# Patient Record
Sex: MALE | Race: OTHER
[De-identification: names, ages, dates, MRNs, and addresses within clinical notes are randomized per-mention and may not be internally consistent; named-entity substitution may affect disease eponyms.]

---

## 2017-01-12 ENCOUNTER — HOSPITAL ENCOUNTER (INPATIENT)
Dept: HOSPITAL 53 - M ED | Age: 27
LOS: 5 days | Discharge: HOME | DRG: 882 | End: 2017-01-17
Attending: PSYCHIATRY & NEUROLOGY | Admitting: PSYCHIATRY & NEUROLOGY
Payer: OTHER GOVERNMENT

## 2017-01-12 VITALS — SYSTOLIC BLOOD PRESSURE: 143 MMHG | DIASTOLIC BLOOD PRESSURE: 78 MMHG

## 2017-01-12 VITALS — WEIGHT: 235.01 LBS | BODY MASS INDEX: 30.16 KG/M2 | HEIGHT: 74 IN

## 2017-01-12 DIAGNOSIS — F43.23: Primary | ICD-10-CM

## 2017-01-12 DIAGNOSIS — F43.10: ICD-10-CM

## 2017-01-12 LAB
ALBUMIN SERPL BCG-MCNC: 4.5 GM/DL (ref 3.2–5.2)
ALBUMIN/GLOB SERPL: 1.25 {RATIO} (ref 1–1.93)
ALP SERPL-CCNC: 108 U/L (ref 45–117)
ALT SERPL W P-5'-P-CCNC: 32 U/L (ref 12–78)
AMPHETAMINES UR QL SCN: NEGATIVE
ANION GAP SERPL CALC-SCNC: 6 MEQ/L (ref 8–16)
AST SERPL-CCNC: 19 U/L (ref 15–37)
BENZODIAZ UR QL SCN: NEGATIVE
BILIRUB CONJ SERPL-MCNC: 0.1 MG/DL (ref 0–0.2)
BILIRUB SERPL-MCNC: 0.6 MG/DL (ref 0.2–1)
BUN SERPL-MCNC: 11 MG/DL (ref 7–18)
BZE UR QL SCN: NEGATIVE
CALCIUM SERPL-MCNC: 9.6 MG/DL (ref 8.5–10.1)
CHLORIDE SERPL-SCNC: 101 MEQ/L (ref 98–107)
CO2 SERPL-SCNC: 33 MEQ/L (ref 21–32)
CONTROL LINE: (no result)
CREAT SERPL-MCNC: 1.22 MG/DL (ref 0.7–1.3)
ERYTHROCYTE [DISTWIDTH] IN BLOOD BY AUTOMATED COUNT: 13.2 % (ref 11.5–14.5)
GFR SERPL CREATININE-BSD FRML MDRD: > 60 ML/MIN/{1.73_M2} (ref 60–?)
GLUCOSE SERPL-MCNC: 84 MG/DL (ref 70–105)
MCH RBC QN AUTO: 27.5 PG (ref 27–33)
MCHC RBC AUTO-ENTMCNC: 34.2 G/DL (ref 32–36.5)
MCV RBC AUTO: 80.2 FL (ref 80–96)
METHADONE UR QL SCN: NEGATIVE
OPIATES UR QL SCN: NEGATIVE
PLATELET # BLD AUTO: 189 K/MM3 (ref 150–450)
POTASSIUM SERPL-SCNC: 4.3 MEQ/L (ref 3.5–5.1)
PROT SERPL-MCNC: 8.1 GM/DL (ref 6.4–8.2)
SODIUM SERPL-SCNC: 140 MEQ/L (ref 136–145)
TRICYCLIC ANTIDEPRESS URINE: NEGATIVE
WBC # BLD AUTO: 5.5 K/MM3 (ref 4–10)

## 2017-01-12 NOTE — EDDOCDS
Physician Documentation                                                                           

[facility]                                                                         

Name: John Verde                                                                                

Age: 27 yrs                                                                                       

Sex: Male                                                                                         

: 1990                                                                                   

MRN: [id number]                                                                                     

Arrival Date: 2017                                                                          

Time: 10:19                                                                                       

Account#: [id number]                                                                              

Bed [facility]                                                                                          

Private MD:                                                                                       

Disposition:                                                                                      

17 12:49 Hospitalization ordered by Arron Laboy for Inpatient Admission.           

Preliminary diagnosis is Suicidal ideations.                                                    

- Bed requested for Admit.                                                                        

- Status is Inpatient Admission.                                                              ms2 

- Condition is Stable.                                                                            

- Problem is an ongoing problem.                                                                  

- Symptoms are unchanged.                                                                         

                                                                                                  

                                                                                                  

                                                                                                  

Historical:                                                                                       

- Allergies: Chocolate; Chocolate (migraines);                                                    

- Home Meds:                                                                                      

1. none                                                                                         

2. none                                                                                         

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                                                      

- PSHx: none; umbilical hernia repair;                                                            

- Social history: Smoking status: Patient states was never smoker of tobacco. No                  

barriers to communication noted, The patient speaks fluent English, Smoking status:             

Patient states was never smoker of tobacco. No barriers to communication noted, The             

patient speaks fluent English.                                                                  

- Family history: Not pertinent.                                                                  

- : The pt / caregiver states he / she is not on anticoagulants. The pt / caregiver               

states he / she is not on anticoagulants. Home medication list is obtained from the             

patient, Home medication list is obtained from the patient.                                     

- Exposure Risk Screening:: None identified. None identified.                                     

                                                                                                  

                                                                                                  

Vital Signs:                                                                                      

                                                                                             

11:31  / 67; Pulse 82; Resp 16; Temp 97.8(O); Pulse Ox 98% on R/A; Weight 102.06 kg /   dwg 

      225 lbs; Height 6 ft. 3 in. (190.50 cm); Pain 0/10;                                         

14:07  / 88; Pulse 60; Resp 18; Temp 97.5(T); Pulse Ox 98% on R/A;                      dpm 

11:31 Body Mass Index 28.12 (102.06 kg, 190.50 cm)                                            dwg 

                                                                                                  

MDM:                                                                                              

11:24 REGULAR DIET PLASTIC WARE+DIET ordered.                                                 EDMS

11:24 Consult PFS/PSA/ ordered.                                                  dwg 

11:24 Consult PFS/PSA/: Patient's case requires discussion with on-call          dwg 

      Psychiatrist ordered.                                                                       

11:24 PSA/PFS to call Nursing Supervisor, to enter patient data on [place] Safe Act if patient    dwg 

      involuntarily admitted or transferred for SI or HI ordered.                                 

11:24 Confirm accurate psychiatric medication list and times of last dosage ordered.          dwg 

11:24 Detain Pt Until Medically/PFS Cleared ordered.                                          dwg 

11:25 Acetaminophen Level Ordered.                                                            EDMS

11:25 Basic Metabolic Profile Ordered.                                                        EDMS

11:25 Complete Blood Count Ordered.                                                           EDMS

11:25 Drug Eval Toxicology ED Only Ordered.                                                   EDMS

11:25 Ethyl Alcohol (ethanol) Ordered.                                                        EDMS

11:25 Liver Profile Ordered.                                                                  EDMS

11:25 Salicylate Level Ordered.                                                               EDMS

11:25 Thyroid Stimulating Hormone Ordered.                                                    EDMS

12:48 BED REQUEST+ADM ordered.                                                                EDMS

12:49 [place] Safe Act reporting: The patient poses a significant risk to self or others, and      megan ASTORGA/PFS has notified the Nursing Supervisor and he/she will complete the required data      

      entry.                                                                                      

13:27 Financial registration complete.                                                        lg  

13:35 Admit to [facility]: ordered.                                                                 EDMS

13:38 [facility] Payment Agreement was scanned into [employer] and attached to record.               lg  

13:58 [facility] Legal paperwork was scanned into [employer] and attached to record.                    dora  

                                                                                                  

Signatures:                                                                                       

Dispatcher [employer]                           EDMS                                                 

Russell Perea,RN                   RN   [facility]                                                  

Kamran Sevilla RN                      RN   g                                                  

Benjamin Ledesma, PSA                    PSA  Tony Spaulding, Gabino Lin lg, LEONEL guzman                                                   

                                                                                                  

The chart was reviewed and I authenticate all verbal orders and agree with the evaluation and 
treatment provided.Attachments:

13:38 [facility] Payment Agreement                                                                lg  

                                                                                                  

**************************************************************************************************

MTDD

## 2017-01-12 NOTE — EDDOCDS
Nurse's Notes                                                                                     

[facility]                                                                         

Name: John Verde                                                                                

Age: 27 yrs                                                                                       

Sex: Male                                                                                         

: 1990                                                                                   

MRN: [id number]                                                                                     

Arrival Date: 2017                                                                          

Time: 10:19                                                                                       

Account#: [id number]                                                                              

Bed [facility]                                                                                          

Private MD:                                                                                       

Diagnosis: Suicidal ideations                                                                     

                                                                                                  

Presentation:                                                                                     

                                                                                             

10:33 Presenting complaint: Patient states: MHE, increased depression with thoughts of        dwg 

      hurting self, denies plan, states his son is ill and is awaiting surgery.                   

11:28 Mental Health Triage Level: Level 2: The patient displays active suicidal ideations.    [facility] 

      Adult Sepsis Screening: The patient does not have new or worsening altered mentation.       

      Patient's respiratory rate is less than 22. Systolic blood pressure is greater than         

      100. Patient has a qSOFA score of 0- Negative Sepsis Screen. Suicide/Homicide risk          

      assessment- The patient admits to and/or has been reported to be having suicidal            

      ideations.  Status: The patient is an active duty . Transition of     

      care: patient was received from [facility].                                 

11:28 Acuity: RANI Level 3                                                                     [facility] 

11:28 Method Of Arrival: Ambulance                                                            [facility] 

                                                                                                  

Triage Assessment:                                                                                

11:31 General: Appears in no apparent distress. Pain: Denies pain. HIV screening NA for this  [facility] 

      visit Offered previously.                                                                   

                                                                                                  

Historical:                                                                                       

- Allergies: Chocolate; Chocolate (migraines);                                                    

- Home Meds:                                                                                      

1. none                                                                                         

2. none                                                                                         

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                                                      

- PSHx: none; umbilical hernia repair;                                                            

- Social history: Smoking status: Patient states was never smoker of tobacco. No                  

barriers to communication noted, The patient speaks fluent English, Smoking status:             

Patient states was never smoker of tobacco. No barriers to communication noted, The             

patient speaks fluent English.                                                                  

- Family history: Not pertinent.                                                                  

- : The pt / caregiver states he / she is not on anticoagulants. The pt / caregiver               

states he / she is not on anticoagulants. Home medication list is obtained from the             

patient, Home medication list is obtained from the patient.                                     

- Exposure Risk Screening:: None identified. None identified.                                     

                                                                                                  

                                                                                                  

Screenin:33 Screening information is obtained from the patient. Fall risk: No risks identified.     ms2 

      Assistance ADL's: requires no assistance with activities of daily living. Abuse/DV          

      Screen: The patient / caregiver reports he/she is: not in a situation that causes fear,     

      pain or injury. Nutritional screening: No deficits noted. Advance Directives:               

      Currently, there is no health care proxy. There is no active DNR order. There is no         

      living will. There is no Power of . Advance directive information has not           

      previously been placed in an [facility] medical record. Further advance directive information      

      is declined. home support is adequate.                                                      

                                                                                                  

Assessment:                                                                                       

11:33 General: Appears in no apparent distress, comfortable, sitting on side of               ms2 

      stretcher----[employer] with pt. Behavior is soft spoken quiet. Neurological: Level     

      of Consciousness is awake, alert, obeys commands. Respiratory: No deficits noted.           

      Airway is patent Respiratory effort is even, unlabored, Respiratory pattern is regular,     

      symmetrical. Derm: Skin is pink, warm & dry. Musculoskeletal: Range of motion intact in   

      all extremities.                                                                            

12:30 General: Appears in no apparent distress, comfortable, Behavior is cooperative.         ms2 

      General: F. staff remain with pt. Neurological: Level of Consciousness is awake,        

      alert, obeys commands. Cardiovascular: No deficits noted. Derm: Skin is pink, warm &      

      dry. Musculoskeletal: No deficits noted.                                                    

13:17 General: Appears in no apparent distress, comfortable. Neurological: Respiratory: No    ms2 

      deficits noted. Derm: Skin is pink, warm & dry.                                           

14:30 Adult Sepsis Screening: The patient does not have new or worsening altered mentation.   ms2 

      Patient's respiratory rate is less than 22. Systolic blood pressure is greater than         

      100. Patient has a qSOFA score of 0- Negative Sepsis Screen. General: Appears in no         

      apparent distress, Behavior is cooperative. Pain: Denies pain. Neurological: Level of       

      Consciousness is awake, alert, obeys commands. Respiratory: No deficits noted. Airway       

      is patent Respiratory effort is even, unlabored, Respiratory pattern is regular,            

      symmetrical. GI: Abdomen is flat, non- distended. Derm: Skin is pink, warm & dry. Derm:   

      Skin is pink, warm & dry.                                                                 

                                                                                                  

Mental Health Eval:                                                                               

12:48 Mental health consult is initiated at 12:40.  Status: The patient is an active  rb  

      duty . [facility]: The patient is not an established patient       

      of [facility]. Referral Information: Evaluation referral is generated by the     

      patient's psychiatrist Dr. Willis \T\ Vincenzo Hui . The patient was referred for               

      evaluation because Pt presented to ED +SI with plan by motorcycle or drowning. Pt           

      reported depressed over a year, daily thoughts of SI but now has plan. Pt stated            

      "constantly" irritated /angry all the time and doesn't understand why. Pt reported in       

      [employer] for 6 years, deployed to [facility] [id number], moved to [facility] 2016. Pt is in          

      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 . Subjective: The patients chief complaint is       

      Depressed, +SI with plan, anger issues, increased anxiety.. Delusions are denied.           

      Patient's mood is angry, anxious, dysphoric, hopeless, irritable, Hallucinations are        

      denied. Mental Health history: no relevant mental health problems or treatments. Mental     

      Health Admissions: None. Current Outpatient Mental Health Services: None. Current           

      living environment is The patient currently lives with his / her spouse, of 5 years and     

      3 children; 5,4, and 1 yr/old. Pt reported preparing for 3y/o cranial surgery again. .      

      The patient is . Patient presents to Emergency Department with the following         

      symptoms within the past 2 weeks: agitation, anger, anxiety, increased appetite,            

      depressed mood, feelings of helplessness/hopelessness, poor concentration, sleep            

      disturbance - insomnia, suicidal ideation with plan for drowning. motorcycle .              

      Substance abuse: Pt denies. Mental status exam: Patients appearance is appropriate,         

      Patient's behavior is cooperative, minimally responsive Speech is slow. unspontaneous       

      Affect is flat. Mood is dysphoric. Hallucinations are denied. Appetite is characterized     

      by binges. Memory is good. Energy level is lethargic. Content of thought is depressive.     

      , Thought process is characterized by flight of ideas. Cognitive level is oriented to       

      person, place, time and situation Patient's insight is fair. Judgement is fair. Rapport     

      with interviewer is good. Suicidal Ideation present with a plan to kill self by             

      drowning. , motorcycle. Homicidal ideation is not present. Disposition: Medically           

      cleared for disposition by Gabino CASTANEDA Psychiatric Consult is performed by phone        

      with Dr Arron Laboy.                                                                

13:13 [facility] Admission Criteria: The patient is experiencing suicidal ideation. The patient     rb  

      displays symptoms of severe psychiatric disorder resulting in disordered behavior and       

      significant interference with his / her ability to maintain self care. Severe Anxiety.      

      The patient requires continuous observation and/or control to protect self, others or       

      property. The patient's care requires a multi-modal treatment plan under close              

      supervision and coordination due to the complexity and severity of the patient's            

      symptoms. Legal Status: Patient's legal status will be Emergency admission: . [place]        

      Safe [place]: [place] Safe [place] is applicable to this patient. The patient poses a risk to      

      self or other and the Nursing Supervisor has been notified. He/She will enter the           

      patient's data. Insurance Pre-Certification: Not Required.                                  

13:36 DSM-V Differential Diagnosis: Major Depressive Disorder unspecified (F33.9). Awaiting:  rb  

      transfer to [facility].                                                                           

13:58 Narrative: Pt legals placed with belongings.                                            rb  

                                                                                                  

Vital Signs:                                                                                      

11:31  / 67; Pulse 82; Resp 16; Temp 97.8(O); Pulse Ox 98% on R/A; Weight 102.06 kg;    dwg 

      Height 6 ft. 3 in. (190.50 cm); Pain 0/10;                                                  

14:07  / 88; Pulse 60; Resp 18; Temp 97.5(T); Pulse Ox 98% on R/A;                      dpm 

11:31 Body Mass Index 28.12 (102.06 kg, 190.50 cm)                                            [facility] 

                                                                                                  

Vitals:                                                                                           

11:31 Log In Time N/A - ambulance arrival.                                                    [facility] 

                                                                                                  

ED Course:                                                                                        

10:20 Patient visited by Tony Branham Reg.                                                lg  

10:20 Patient moved to Waiting                                                                lg  

10:22 Patient moved to [facility] [[email or web address]                                                                   dpm 

10:27 Pt greeted and oriented to ED. Patient advised of names of staff involved in care,      dpm 

      location of call bell, wait times and NPO status. Patient has correct armband on for        

      positive identification. Placed in gown. Placed in psych safe attire. Bed in low            

      position. Side rails up X 1. Security observing. Property removed, inventory done,          

      secured in belongings bag- placed in locked locker. Placed in locker 2. Psych Safety        

      Check: Location: Psych Room. Visual Assessment: Cooperative.                                

10:46 Patient visited by Pardeep Roach.                                                      dpm 

11:00 Patient visited by Pardeep Roach.                                                      dpm 

11:16 Patient visited by Pardeep Roach.                                                      dpm 

11:29 Patient visited by Russell Perea RN.                                               ms2 

11:29 Triage Initiated                                                                        dwg 

11:35 The patient / caregiver is instructed regarding the plan of care and ED course.         ms2 

      Security observing.                                                                         

11:35 No IV's were initiated during this patient's visit. No procedures done that require     ms2 

      assistance.                                                                                 

11:43 Acetaminophen Level Sent.                                                               ms2 

11:43 Basic Metabolic Profile Sent.                                                           ms2 

11:43 Complete Blood Count Sent.                                                              ms2 

11:43 Drug Eval Toxicology ED Only Sent.                                                      ms2 

11:43 Ethyl Alcohol (ethanol) Sent.                                                           ms2 

11:43 Liver Profile Sent.                                                                     ms2 

11:43 Salicylate Level Sent.                                                                  ms2 

11:43 Thyroid Stimulating Hormone Sent.                                                       ms2 

11:48 Patient visited by Pardeep Roach.                                                      dpm 

12:06 Patient visited by Pardeep Roach.                                                      dpm 

12:21 Patient visited by Pardeep Roach.                                                      dpm 

12:30 The patient / caregiver is instructed regarding the plan of care and ED course.         ms2 

      Security observing.                                                                         

12:31 Gabino Narvaez FNP is PHCP.                                                              ke  

12:31 Patient visited by Gabino Narvaez FNP.                                                   ke  

12:31 Patient visited by Gabino Narvaez FNP.                                                   ke  

12:48 Arron Laboy is Hospitalizing Provider.                                         ke  

12:55 Patient visited by Pardeep Roach.                                                      dpm 

13:15 Patient visited by Russell Perea,LIZZ.                                               ms2 

13:17 The patient / caregiver is instructed regarding the plan of care and ED course.         ms2 

      Security observing.                                                                         

13:25 Patient name changed from John\S\\S\Verde\S\ to John\S\Luis F\S\Verde.                    
   EDMS

13:38 Patient visited by Pardeep Roach.                                                      dpm 

13:38 [facility] Payment Agreement was scanned into [employer] and attached to record.               lg  

13:58 Patient visited by Pardeep Roach.                                                      dpm 

13:58 [facility] Legal paperwork was scanned into [employer] and attached to record.                    jl  

14:03 Patient visited by Pardeep Roach.                                                      dpm 

14:16 Patient visited by Pardeep Roach.                                                      dpm 

14:30 Patient visited by Russell Perea,LIZZ.                                               ms2 

14:31 The patient / caregiver is instructed regarding the plan of care and ED course.         ms2 

      Security observing.                                                                         

14:32 Patient visited by Pardeep Roach.                                                      dpm 

                                                                                                  

Attachments:                                                                                      

13:58 [facility] Legal paperwork                                                                     jl  

                                                                                                  

Order Results:                                                                                    

Lab Order: Acetaminophen Level; SPEC'M 17 11:38                                             

      Test: ACETAMINOPHEN LEVEL; Value: < 2.0; Range: 10.0-30.0; Abnormal: Below low normal;      

      Units: UG/ML; Status: F                                                                     

Lab Order: Basic Metabolic Profile; SPEC'M 17 11:38                                         

      Test: GLUCOSE, FASTING; Value: 84; Range: ; Units: MG/DL; Status: F                   

      Test: BLOOD UREA NITROGEN; Value: 11; Range: 7-18; Units: MG/DL; Status: F                  

      Test: CREATININE FOR GFR; Value: 1.22; Range: 0.70-1.30; Units: MG/DL; Status: F            

      Test: GLOMERULAR FILTRATION RATE; Value: > 60.0; Range: >60; Status: F                      

      Test: SODIUM LEVEL; Value: 140; Range: 136-145; Units: MEQ/L; Status: F                     

      Test: POTASSIUM SERUM; Value: 4.3; Range: 3.5-5.1; Units: MEQ/L; Status: F                  

      Test: CHLORIDE LEVEL; Value: 101; Range: ; Units: MEQ/L; Status: F                    

      Test: CARBON DIOXIDE LEVEL; Value: 33; Range: 21-32; Abnormal: Above high normal;           

      Units: MEQ/L; Status: F                                                                     

      Test: ANION GAP; Value: 6; Range: 8-16; Abnormal: Below low normal; Units: MEQ/L;           

      Status: F                                                                                   

      Test: CALCIUM LEVEL; Value: 9.6; Range: 8.5-10.1; Units: MG/DL; Status: F                   

      Test Note: &nbsp;; Units are mL/min/1.73 m2 Chronic Kidney Disease Staging per NKF:       

      Stage I & II GFR >=60 Normal to Mildly Decreased Stage III GFR 30-59 Moderately           

      Decreased Stage IV GFR 15-29 Severely Decreased Stage V GFR <15 Very Little GFR Left        

      ESRD GFR <15 on RRT                                                                         

Lab Order: Complete Blood Count; SPEC'M 17 11:38                                            

      Test: WHITE BLOOD COUNT; Value: 5.5; Range: 4.0-10.0; Units: K/mm3; Status: F               

      Test: RED BLOOD COUNT; Value: 6.26; Range: 4.30-6.10; Abnormal: Above high normal;          

      Units: M/mm3; Status: F                                                                     

      Test: HEMOGLOBIN; Value: 17.2; Range: 14.0-18.0; Units: g/dl; Status: F                     

      Test: HEMATOCRIT; Value: 50.2; Range: 42.0-52.0; Units: %; Status: F                        

      Test: MEAN CORPUSCULAR VOLUME; Value: 80.2; Range: 80.0-96.0; Units: fl; Status: F          

      Test: MEAN CORPUSCULAR HEMOGLOBIN; Value: 27.5; Range: 27.0-33.0; Units: pg; Status: F      

      Test: MEAN CORPUSCULAR HGB CONC; Value: 34.2; Range: 32.0-36.5; Units: g/dl; Status: F      

      Test: RED CELL DISTRIBUTION WIDTH; Value: 13.2; Range: 11.5-14.5; Units: %; Status: F       

      Test: PLATELET COUNT, AUTOMATED; Value: 189; Range: 150-450; Units: k/mm3; Status: F        

Lab Order: Drug Eval Toxicology ED Only; SPEC'M 17 11:38                                    

      Test: AMPHETAMINES LEVEL URINE; Value: NEGATIVE; Range: NEGATIVE; Status: F                 

      Test: BARBITURATES URINE; Value: NEGATIVE; Range: NEGATIVE; Status: F                       

      Test: BENZODIAZEPINES URINE; Value: NEGATIVE; Range: NEGATIVE; Status: F                    

      Test: CANNABINOIDS URINE; Value: NEGATIVE; Range: NEGATIVE; Status: F                       

      Test: COCAINE METABOLITE URINE; Value: NEGATIVE; Range: NEGATIVE; Status: F                 

      Test: METHADONE URINE; Value: NEGATIVE; Range: NEGATIVE; Status: F                          

      Test: OPIATES URINE; Value: NEGATIVE; Range: NEGATIVE; Status: F                            

      Test: TRICYCLIC ANTIDEPRESS URINE; Value: NEGATIVE; Range: NEGATIVE; Status: F              

      Test Note: &nbsp;; ****ALL PRESUMPTIVE POSITIVE FINDINGS ARE UNCONFIRMED**** NORMAL       

      VALUES THRESHOLD IN NG/ML AMPHETAMINES 1000 METHAMPHETAMINES 1000 BARBITURATES 300          

      BENZODIAZEPINES 300 CANNABINOIDS (THC) 50 COCAINE METABOLITE 300 METHADONE 300 OPIATES      

      300 PHENCYCLIDINE 25 TRICYCLIC ANTIDEPRESSANTS 1000 RESULTS ARE FOR MEDICAL PURPOSES        

      ONLY. ALL URINE SPECIMENS WILL BE SAVED FOR 3 DAYS. IF CONFIRMATION OF A PRESUMPTIVE        

      POSTIVE SCREEN RESULT IS DESIRED, CALL CHEMISTRY () AND REQUEST URINE TO BE SENT       

      TO REFERENCE LAB. FOR A LIST OF CLOSELY RELATED COMPOUNDS PLEASE CALL THE LAB.              

Lab Order: Ethyl Alcohol (ethanol); SPEC'M 17 11:38                                         

      Test: ETHYL ALCOHOL (ETHANOL); Value: < 0.003; Range: 0.000-0.010; Units: %; Status: F      

Lab Order: Liver Profile; SPEC'M 17 11:38                                                   

      Test: AST/SGOT; Value: 19; Range: 15-37; Units: U/L; Status: F                              

      Test: ALT/SGPT; Value: 32; Range: 12-78; Units: U/L; Status: F                              

      Test: ALKALINE PHOSPHATASE; Value: 108; Range: ; Units: U/L; Status: F                

      Test: BILIRUBIN,TOTAL; Value: 0.6; Range: 0.2-1.0; Units: MG/DL; Status: F                  

      Test: BILIRUBIN,DIRECT; Value: 0.1; Range: 0.0-0.2; Units: MG/DL; Status: F                 

      Test: TOTAL PROTEIN; Value: 8.1; Range: 6.4-8.2; Units: GM/DL; Status: F                    

      Test: ALBUMIN; Value: 4.5; Range: 3.2-5.2; Units: GM/DL; Status: F                          

      Test: ALBUMIN/GLOBULIN RATIO; Value: 1.25; Range: 1.00-1.93; Status: F                      

Lab Order: Salicylate Level; SPEC17 11:38                                                

      Test: SALICYLATE LEVEL; Value: < 1.7; Range: 5.0-30.0; Abnormal: Below low normal;          

      Units: MG/DL; Status: F                                                                     

Lab Order: Thyroid Stimulating Hormone; SPEC17 11:38                                     

      Test: THYROID STIMULATING HORMONE; Value: 1.050; Range: 0.358-3.740; Units: uIU/ML;         

      Status: F                                                                                   

                                                                                                  

Outcome:                                                                                          

12:49 Decision to Hospitalize by Provider.                                                    ke  

14:31 Discharge Assessment: patient administered narcotics - no. The following High Risk      ms2 

      Discharge criteria are identified: None. Admitted to Psych accompanied by tech, via         

      wheelchair, with chart. Condition: stable. No special radiology studies were completed.     

14:32 Patient left the ED.                                                                    ms2 

                                                                                                  

Signatures:                                                                                       

Dispatcher [facility]                           Russell Cohen RN RN   ms2                                                  

Kamran Sevilla RN RN dwg Baxter, Renee, PSA                      PSA  [facility], Benjamin, PSA                    PSA  jl                                                   

Tony Branham, Reg                    Reg  lg                                                   

Gabino Narvaez, Pardeep Noble dpm                                                  

                                                                                                  

Corrections: (The following items were deleted from the chart)                                    

13:14 12:48 Disposition: Medically cleared for disposition by Gabino CASTANEDA [facility]     rb  

      Consult is rb                                                                               

                                                                                                  

**************************************************************************************************

MTDD

## 2017-01-13 VITALS — DIASTOLIC BLOOD PRESSURE: 65 MMHG | SYSTOLIC BLOOD PRESSURE: 138 MMHG

## 2017-01-13 VITALS — DIASTOLIC BLOOD PRESSURE: 62 MMHG | SYSTOLIC BLOOD PRESSURE: 130 MMHG

## 2017-01-13 NOTE — HPEPDOC
Medical History and Physical


Date of Admission


Jan 12, 2017 at 14:40





History and Physical





PCP: [facility]


ATTENDING: Dr. Juan Velazquez





HPI: 27yoM admitted to [facility] for major depressive disorder, being medically 

examined today. No acute medical complaints today. Denies any fevers, chills, 

weakness, fatigue, HA, CP, SOB, cough, palpitations, abdominal pain, N/V/D or 

changes in bowel or bladder habits.





PMHx: 


History of rhabdomyolysis


Depression


History of benign brain cyst


States one prior head injury during sports injury. 





PSHX:


Umbilical hernia repair





SOCHX:


Resides in: [facility]


Marital Status: 


Kids: 3


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 


Tobacco use: Denies


ETOH: 2 times per month 3-4 drinks


Illicit Drugs: Denies


IV Drug Use: Denies


Tattoos done unprofessionally: Denies 





FAMHX:


Mother: Estranged


Father: Alive, well


Siblings: 2 sisters Alive, well


Children: Alive, well


Unexpected deaths due to medical reasons: None.





ROS:


As noted in HPI, otherwise 11pt ROS of systems reviewed and unremarkable 


                 


PE:      


GEN:  27yoM, appears stated age. Well-nourished, well developed. No acute 

distress. Alert and oriented x 3. Pleasant, interactive. 


HEENT: Normocephalic, atraumatic. Pupils are equal, round, and reactive to 

light. Extraocular movements are intact. No nystagmus appreciated. Sclera are 

nonicteric. Conjunctiva without injection. Nose midline. Nasal turbinates 

without bogginess. EACs both patent BL. TMs both visualized and gray with good 

cone of light, no bulging or erythema. No facial asymmetry. Moist mucous 

membranes. Dentition fair. Pharynx pink and moist, no cobblestoning. Neck supple

, trachea midline. No lymphadenopathy or thyromegaly appreciated. 


CHEST: Regular rate and rhythm, +S1, +S2


LUNGS: Clear to auscultation bilaterally. No wheezes, rales, or rhonchi. 

Breathing appears symmetric and easy. Patient is speaking in full sentences. No 

accessory muscle use.


ABD: Round, soft, non-tender, non-distended. +Bowel sounds throughout. No 

rebound or guarding. No costovertebral angle tenderness.


EXT: Pulses 2+ bilaterally dorsalis pedis and radial. No lower extremity edema 

appreciated.


SKIN: Pink, dry, warm. Capillary refill <2sec. No rashes.


NEURO: Alert and oriented x 3. Cranial nerves III-XII are intact. No focal 

deficits appreciated. 





EKG: Pending.








A&P:  27yoM admitted to [facility] for major depressive disorder


1. Psych. Plan per Psychiatry. Obtain baseline EKG to assure the safety of 

psychiatric medications as they can prolong the QT interval.


2. Follow up with PCP on discharge. [facility]. 


3. Staff member present throughout exam, safety aid Ed.





Vital Signs





 Vital Signs








Label Value  Date Time


 


Patient Temperature 96.9 degrees F 1/13/17 0608


 


Temperature Source Tympanic 1/13/17 0608


 


Pulse 65 1/13/17 0608


 


Respiratory Rate 16 bpm 1/13/17 0608


 


Blood Pressure Assessment 138/65 (89) 1/13/17 0608











Laboratory Data


Labs 24H


 Laboratory Tests 2


1/12/17 11:38: 


Acetaminophen Level < 2.0L, Aspartate Amino Transf (AST/SGOT) 19, Alanine 

Aminotransferase (ALT/SGPT) 32, Alkaline Phosphatase 108, Total Bilirubin 0.6, 

Direct Bilirubin 0.1, Albumin 4.5, Albumin/Globulin Ratio 1.25, Anion Gap 6L, 

Calcium Level 9.6, Ethyl Alcohol Level < 0.003, Glomerular Filtration Rate > 

60.0, Salicylates Level < 1.7L, Thyroid Stimulating Hormone (TSH) 1.050, Total 

Protein 8.1, Urine Amphetamine Level NEGATIVE, Urine Benzodiazepines Screen 

NEGATIVE, Urine Cannabinoids NEGATIVE, Urine Cocaine Metabolite NEGATIVE, Urine 

Opiates Screen NEGATIVE, Urine Barbiturates, Qualitative NEGATIVE, Urine 

Methadone Screen NEGATIVE, Urine Tricyclic Antidepressants NEGATIVE


CBC/BMP


 Laboratory Tests


1/12/17 11:38








Red Blood Count 6.26 H, Mean Corpuscular Volume 80.2, Mean Corpuscular 

Hemoglobin 27.5, Mean Corpuscular Hemoglobin Concent 34.2, Red Cell 

Distribution Width 13.2





Home Medications


No Active Prescriptions or Reported Meds





Allergies


Coded Allergies:  


     Chocolate (Unverified  Adverse Reaction, Unknown, MIGRAINES, 1/12/17)








Jaclyn Olson Jan 13, 2017 10:13
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
## 2017-01-14 VITALS — SYSTOLIC BLOOD PRESSURE: 122 MMHG | DIASTOLIC BLOOD PRESSURE: 63 MMHG

## 2017-01-14 VITALS — DIASTOLIC BLOOD PRESSURE: 60 MMHG | SYSTOLIC BLOOD PRESSURE: 130 MMHG

## 2017-01-14 RX ADMIN — SERTRALINE HYDROCHLORIDE SCH MG: 50 TABLET ORAL at 08:00

## 2017-01-14 RX ADMIN — FAMOTIDINE SCH MG: 20 TABLET, FILM COATED ORAL at 13:06

## 2017-01-14 NOTE — EDDOCDS
Nurse's Notes                                                                                     

[facility]                                                                         

Name: John Verde                                                                                

Age: 27 yrs                                                                                       

Sex: Male                                                                                         

: 1990                                                                                   

MRN: [id number]                                                                                     

Arrival Date: 2017                                                                          

Time: 10:19                                                                                       

Account#: [id number]                                                                              

[facility]                                                                                          

Private MD:                                                                                       

Diagnosis: Suicidal ideations                                                                     

                                                                                                  

Presentation:                                                                                     

                                                                                             

10:33 Presenting complaint: Patient states: MHE, increased depression with thoughts of        dwg 

      hurting self, denies plan, states his son is ill and is awaiting surgery.                   

11:28 Mental Health Triage Level: Level 2: The patient displays active suicidal ideations.    [facility] 

      Adult Sepsis Screening: The patient does not have new or worsening altered mentation.       

      Patient's respiratory rate is less than 22. Systolic blood pressure is greater than         

      100. Patient has a qSOFA score of 0- Negative Sepsis Screen. Suicide/Homicide risk          

      assessment- The patient admits to and/or has been reported to be having suicidal            

      ideations.  Status: The patient is an active duty . Transition of     

      care: patient was received from [facility].                                 

11:28 Acuity: RANI Level 3                                                                     [facility] 

11:28 Method Of Arrival: Ambulance                                                            [facility] 

                                                                                                  

Triage Assessment:                                                                                

11:31 General: Appears in no apparent distress. Pain: Denies pain. HIV screening NA for this  [facility] 

      visit Offered previously.                                                                   

                                                                                                  

Historical:                                                                                       

- Allergies: Chocolate; Chocolate (migraines);                                                    

- Home Meds:                                                                                      

1. none                                                                                         

2. none                                                                                         

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                                                      

- PSHx: none; umbilical hernia repair;                                                            

- Social history: Smoking status: Patient states was never smoker of tobacco. No                  

barriers to communication noted, The patient speaks fluent English, Smoking status:             

Patient states was never smoker of tobacco. No barriers to communication noted, The             

patient speaks fluent English.                                                                  

- Family history: Not pertinent.                                                                  

- : The pt / caregiver states he / she is not on anticoagulants. The pt / caregiver               

states he / she is not on anticoagulants. Home medication list is obtained from the             

patient, Home medication list is obtained from the patient.                                     

- Exposure Risk Screening:: None identified. None identified.                                     

                                                                                                  

                                                                                                  

Screenin:33 Screening information is obtained from the patient. Fall risk: No risks identified.     ms2 

      Assistance ADL's: requires no assistance with activities of daily living. Abuse/DV          

      Screen: The patient / caregiver reports he/she is: not in a situation that causes fear,     

      pain or injury. Nutritional screening: No deficits noted. Advance Directives:               

      Currently, there is no health care proxy. There is no active DNR order. There is no         

      living will. There is no Power of . Advance directive information has not           

      previously been placed in an [facility] medical record. Further advance directive information      

      is declined. home support is adequate.                                                      

                                                                                                  

Assessment:                                                                                       

11:33 General: Appears in no apparent distress, comfortable, sitting on side of               ms2 

      stretcher----[employer] with pt. Behavior is soft spoken quiet. Neurological: Level     

      of Consciousness is awake, alert, obeys commands. Respiratory: No deficits noted.           

      Airway is patent Respiratory effort is even, unlabored, Respiratory pattern is regular,     

      symmetrical. Derm: Skin is pink, warm & dry. Musculoskeletal: Range of motion intact in   

      all extremities.                                                                            

12:30 General: Appears in no apparent distress, comfortable, Behavior is cooperative.         ms2 

      General: F. staff remain with pt. Neurological: Level of Consciousness is awake,        

      alert, obeys commands. Cardiovascular: No deficits noted. Derm: Skin is pink, warm &      

      dry. Musculoskeletal: No deficits noted.                                                    

13:17 General: Appears in no apparent distress, comfortable. Neurological: Respiratory: No    ms2 

      deficits noted. Derm: Skin is pink, warm & dry.                                           

14:30 Adult Sepsis Screening: The patient does not have new or worsening altered mentation.   ms2 

      Patient's respiratory rate is less than 22. Systolic blood pressure is greater than         

      100. Patient has a qSOFA score of 0- Negative Sepsis Screen. General: Appears in no         

      apparent distress, Behavior is cooperative. Pain: Denies pain. Neurological: Level of       

      Consciousness is awake, alert, obeys commands. Respiratory: No deficits noted. Airway       

      is patent Respiratory effort is even, unlabored, Respiratory pattern is regular,            

      symmetrical. GI: Abdomen is flat, non- distended. Derm: Skin is pink, warm & dry. Derm:   

      Skin is pink, warm & dry.                                                                 

                                                                                                  

Mental Health Eval:                                                                               

12:48 Mental health consult is initiated at 12:40.  Status: The patient is an active  rb  

      duty . [facility]: The patient is not an established patient       

      of [facility]. Referral Information: Evaluation referral is generated by the     

      patient's psychiatrist Dr. Willis \T\ Vincenzo Hui . The patient was referred for               

      evaluation because Pt presented to ED +SI with plan by motorcycle or drowning. Pt           

      reported depressed over a year, daily thoughts of SI but now has plan. Pt stated            

      "constantly" irritated /angry all the time and doesn't understand why. Pt reported in       

      [employer] for 6 years, deployed to [facility] [id number], moved to [facility] 2016. Pt is in          

      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 . Subjective: The patients chief complaint is       

      Depressed, +SI with plan, anger issues, increased anxiety.. Delusions are denied.           

      Patient's mood is angry, anxious, dysphoric, hopeless, irritable, Hallucinations are        

      denied. Mental Health history: no relevant mental health problems or treatments. Mental     

      Health Admissions: None. Current Outpatient Mental Health Services: None. Current           

      living environment is The patient currently lives with his / her spouse, of 5 years and     

      3 children; 5,4, and 1 yr/old. Pt reported preparing for 3y/o cranial surgery again. .      

      The patient is . Patient presents to Emergency Department with the following         

      symptoms within the past 2 weeks: agitation, anger, anxiety, increased appetite,            

      depressed mood, feelings of helplessness/hopelessness, poor concentration, sleep            

      disturbance - insomnia, suicidal ideation with plan for drowning. motorcycle .              

      Substance abuse: Pt denies. Mental status exam: Patients appearance is appropriate,         

      Patient's behavior is cooperative, minimally responsive Speech is slow. unspontaneous       

      Affect is flat. Mood is dysphoric. Hallucinations are denied. Appetite is characterized     

      by binges. Memory is good. Energy level is lethargic. Content of thought is depressive.     

      , Thought process is characterized by flight of ideas. Cognitive level is oriented to       

      person, place, time and situation Patient's insight is fair. Judgement is fair. Rapport     

      with interviewer is good. Suicidal Ideation present with a plan to kill self by             

      drowning. , motorcycle. Homicidal ideation is not present. Disposition: Medically           

      cleared for disposition by Gabino CASTANEDA Psychiatric Consult is performed by phone        

      with Dr Arron Laboy.                                                                

13:13 [facility] Admission Criteria: The patient is experiencing suicidal ideation. The patient     rb  

      displays symptoms of severe psychiatric disorder resulting in disordered behavior and       

      significant interference with his / her ability to maintain self care. Severe Anxiety.      

      The patient requires continuous observation and/or control to protect self, others or       

      property. The patient's care requires a multi-modal treatment plan under close              

      supervision and coordination due to the complexity and severity of the patient's            

      symptoms. Legal Status: Patient's legal status will be Emergency admission: . [place]        

      Safe [place]: [place] Safe [place] is applicable to this patient. The patient poses a risk to      

      self or other and the Nursing Supervisor has been notified. He/She will enter the           

      patient's data. Insurance Pre-Certification: Not Required.                                  

13:36 DSM-V Differential Diagnosis: Major Depressive Disorder unspecified (F33.9). Awaiting:  rb  

      transfer to [facility].                                                                           

13:58 Narrative: Pt legals placed with belongings.                                            rb  

                                                                                                  

Vital Signs:                                                                                      

11:31  / 67; Pulse 82; Resp 16; Temp 97.8(O); Pulse Ox 98% on R/A; Weight 102.06 kg;    dwg 

      Height 6 ft. 3 in. (190.50 cm); Pain 0/10;                                                  

14:07  / 88; Pulse 60; Resp 18; Temp 97.5(T); Pulse Ox 98% on R/A;                      dpm 

11:31 Body Mass Index 28.12 (102.06 kg, 190.50 cm)                                            [facility] 

                                                                                                  

Vitals:                                                                                           

11:31 Log In Time N/A - ambulance arrival.                                                    [facility] 

                                                                                                  

ED Course:                                                                                        

10:20 Patient visited by Tony Branham Reg.                                                lg  

10:20 Patient moved to Waiting                                                                lg  

10:22 Patient moved to [facility]                                                                   dpm 

10:27 Pt greeted and oriented to ED. Patient advised of names of staff involved in care,      dpm 

      location of call bell, wait times and NPO status. Patient has correct armband on for        

      positive identification. Placed in gown. Placed in psych safe attire. Bed in low            

      position. Side rails up X 1. Security observing. Property removed, inventory done,          

      secured in belongings bag- placed in locked locker. Placed in locker 2. Psych Safety        

      Check: Location: Psych Room. Visual Assessment: Cooperative.                                

10:46 Patient visited by Pardeep Roach.                                                      dpm 

11:00 Patient visited by Pardeep Roach.                                                      dpm 

11:16 Patient visited by Pardeep Roach.                                                      dpm 

11:29 Patient visited by Russell Perea RN.                                               ms2 

11:29 Triage Initiated                                                                        dwg 

11:35 The patient / caregiver is instructed regarding the plan of care and ED course.         ms2 

      Security observing.                                                                         

11:35 No IV's were initiated during this patient's visit. No procedures done that require     ms2 

      assistance.                                                                                 

11:43 Acetaminophen Level Sent.                                                               ms2 

11:43 Basic Metabolic Profile Sent.                                                           ms2 

11:43 Complete Blood Count Sent.                                                              ms2 

11:43 Drug Eval Toxicology ED Only Sent.                                                      ms2 

11:43 Ethyl Alcohol (ethanol) Sent.                                                           ms2 

11:43 Liver Profile Sent.                                                                     ms2 

11:43 Salicylate Level Sent.                                                                  ms2 

11:43 Thyroid Stimulating Hormone Sent.                                                       ms2 

11:48 Patient visited by Pardeep Roach.                                                      dpm 

12:06 Patient visited by Pardeep Roach.                                                      dpm 

12:21 Patient visited by Pardeep Roach.                                                      dpm 

12:30 The patient / caregiver is instructed regarding the plan of care and ED course.         ms2 

      Security observing.                                                                         

12:31 Gabino Narvaez FNP is PHCP.                                                              ke  

12:31 Patient visited by Gabino Narvaez FNP.                                                   ke  

12:31 Patient visited by Gabino Narvaez FNP.                                                   ke  

12:48 Arron Laboy is Hospitalizing Provider.                                         ke  

12:55 Patient visited by Pardeep Roach.                                                      dpm 

13:15 Patient visited by Russell Perea,LIZZ.                                               ms2 

13:17 The patient / caregiver is instructed regarding the plan of care and ED course.         ms2 

      Security observing.                                                                         

13:25 Patient name changed from John\S\\S\Verde\S\ to John\S\Luis F\S\Verde.                    
   EDMS

13:38 Patient visited by Pardeep Roach.                                                      dpm 

13:38 [facility] Payment Agreement was scanned into [employer] and attached to record.               lg  

13:58 Patient visited by Pardeep Roach.                                                      dpm 

13:58 [facility] Legal paperwork was scanned into [employer] and attached to record.                    jl  

14:03 Patient visited by Pardeep Roach.                                                      dpm 

14:16 Patient visited by Pardeep Roach.                                                      dpm 

14:30 Patient visited by Russell Perea,LIZZ.                                               ms2 

14:31 The patient / caregiver is instructed regarding the plan of care and ED course.         ms2 

      Security observing.                                                                         

14:32 Patient visited by Pardeep Roach.                                                      dpm 

                                                                                             

11:19 T-Sheet-- Draft Copy was scanned into [employer] and attached to record.                   gb  

                                                                                                  

Attachments:                                                                                      

13:58 E Legal paperwork                                                                     jl  

                                                                                                  

Order Results:                                                                                    

Lab Order: Acetaminophen Level; SPEC'M 17 11:38                                             

      Test: ACETAMINOPHEN LEVEL; Value: < 2.0; Range: 10.0-30.0; Abnormal: Below low normal;      

      Units: UG/ML; Status: F                                                                     

Lab Order: Basic Metabolic Profile; SPEC'M 17 11:38                                         

      Test: GLUCOSE, FASTING; Value: 84; Range: ; Units: MG/DL; Status: F                   

      Test: BLOOD UREA NITROGEN; Value: 11; Range: 7-18; Units: MG/DL; Status: F                  

      Test: CREATININE FOR GFR; Value: 1.22; Range: 0.70-1.30; Units: MG/DL; Status: F            

      Test: GLOMERULAR FILTRATION RATE; Value: > 60.0; Range: >60; Status: F                      

      Test: SODIUM LEVEL; Value: 140; Range: 136-145; Units: MEQ/L; Status: F                     

      Test: POTASSIUM SERUM; Value: 4.3; Range: 3.5-5.1; Units: MEQ/L; Status: F                  

      Test: CHLORIDE LEVEL; Value: 101; Range: ; Units: MEQ/L; Status: F                    

      Test: CARBON DIOXIDE LEVEL; Value: 33; Range: 21-32; Abnormal: Above high normal;           

      Units: MEQ/L; Status: F                                                                     

      Test: ANION GAP; Value: 6; Range: 8-16; Abnormal: Below low normal; Units: MEQ/L;           

      Status: F                                                                                   

      Test: CALCIUM LEVEL; Value: 9.6; Range: 8.5-10.1; Units: MG/DL; Status: F                   

      Test Note: &nbsp;; Units are mL/min/1.73 m2 Chronic Kidney Disease Staging per NKF:       

      Stage I & II GFR >=60 Normal to Mildly Decreased Stage III GFR 30-59 Moderately           

      Decreased Stage IV GFR 15-29 Severely Decreased Stage V GFR <15 Very Little GFR Left        

      ESRD GFR <15 on RRT                                                                         

Lab Order: Complete Blood Count; SPEC'M 17 11:38                                            

      Test: WHITE BLOOD COUNT; Value: 5.5; Range: 4.0-10.0; Units: K/mm3; Status: F               

      Test: RED BLOOD COUNT; Value: 6.26; Range: 4.30-6.10; Abnormal: Above high normal;          

      Units: M/mm3; Status: F                                                                     

      Test: HEMOGLOBIN; Value: 17.2; Range: 14.0-18.0; Units: g/dl; Status: F                     

      Test: HEMATOCRIT; Value: 50.2; Range: 42.0-52.0; Units: %; Status: F                        

      Test: MEAN CORPUSCULAR VOLUME; Value: 80.2; Range: 80.0-96.0; Units: fl; Status: F          

      Test: MEAN CORPUSCULAR HEMOGLOBIN; Value: 27.5; Range: 27.0-33.0; Units: pg; Status: F      

      Test: MEAN CORPUSCULAR HGB CONC; Value: 34.2; Range: 32.0-36.5; Units: g/dl; Status: F      

      Test: RED CELL DISTRIBUTION WIDTH; Value: 13.2; Range: 11.5-14.5; Units: %; Status: F       

      Test: PLATELET COUNT, AUTOMATED; Value: 189; Range: 150-450; Units: k/mm3; Status: F        

Lab Order: Drug Eval Toxicology ED Only; SPEC'M 17 11:38                                    

      Test: AMPHETAMINES LEVEL URINE; Value: NEGATIVE; Range: NEGATIVE; Status: F                 

      Test: BARBITURATES URINE; Value: NEGATIVE; Range: NEGATIVE; Status: F                       

      Test: BENZODIAZEPINES URINE; Value: NEGATIVE; Range: NEGATIVE; Status: F                    

      Test: CANNABINOIDS URINE; Value: NEGATIVE; Range: NEGATIVE; Status: F                       

      Test: COCAINE METABOLITE URINE; Value: NEGATIVE; Range: NEGATIVE; Status: F                 

      Test: METHADONE URINE; Value: NEGATIVE; Range: NEGATIVE; Status: F                          

      Test: OPIATES URINE; Value: NEGATIVE; Range: NEGATIVE; Status: F                            

      Test: TRICYCLIC ANTIDEPRESS URINE; Value: NEGATIVE; Range: NEGATIVE; Status: F              

      Test Note: &nbsp;; ****ALL PRESUMPTIVE POSITIVE FINDINGS ARE UNCONFIRMED**** NORMAL       

      VALUES THRESHOLD IN NG/ML AMPHETAMINES 1000 METHAMPHETAMINES 1000 BARBITURATES 300          

      BENZODIAZEPINES 300 CANNABINOIDS (THC) 50 COCAINE METABOLITE 300 METHADONE 300 OPIATES      

      300 PHENCYCLIDINE 25 TRICYCLIC ANTIDEPRESSANTS 1000 RESULTS ARE FOR MEDICAL PURPOSES        

      ONLY. ALL URINE SPECIMENS WILL BE SAVED FOR 3 DAYS. IF CONFIRMATION OF A PRESUMPTIVE        

      POSTIVE SCREEN RESULT IS DESIRED, CALL CHEMISTRY () AND REQUEST URINE TO BE SENT       

      TO REFERENCE LAB. FOR A LIST OF CLOSELY RELATED COMPOUNDS PLEASE CALL THE LAB.              

Lab Order: Ethyl Alcohol (ethanol); SPEC'M 17 11:38                                         

      Test: ETHYL ALCOHOL (ETHANOL); Value: < 0.003; Range: 0.000-0.010; Units: %; Status: F      

Lab Order: Liver Profile; SPEC'M 17 11:38                                                   

      Test: AST/SGOT; Value: 19; Range: 15-37; Units: U/L; Status: F                              

      Test: ALT/SGPT; Value: 32; Range: 12-78; Units: U/L; Status: F                              

      Test: ALKALINE PHOSPHATASE; Value: 108; Range: ; Units: U/L; Status: F                

      Test: BILIRUBIN,TOTAL; Value: 0.6; Range: 0.2-1.0; Units: MG/DL; Status: F                  

      Test: BILIRUBIN,DIRECT; Value: 0.1; Range: 0.0-0.2; Units: MG/DL; Status: F                 

      Test: TOTAL PROTEIN; Value: 8.1; Range: 6.4-8.2; Units: GM/DL; Status: F                    

      Test: ALBUMIN; Value: 4.5; Range: 3.2-5.2; Units: GM/DL; Status: F                          

      Test: ALBUMIN/GLOBULIN RATIO; Value: 1.25; Range: 1.00-1.93; Status: F                      

Lab Order: Salicylate Level; SPEC'M 17 11:38                                                

      Test: SALICYLATE LEVEL; Value: < 1.7; Range: 5.0-30.0; Abnormal: Below low normal;          

      Units: MG/DL; Status: F                                                                     

Lab Order: Thyroid Stimulating Hormone; SPEC'M 17 11:38                                     

      Test: THYROID STIMULATING HORMONE; Value: 1.050; Range: 0.358-3.740; Units: uIU/ML;         

      Status: F                                                                                   

                                                                                                  

Outcome:                                                                                          

                                                                                             

12:49 Decision to Hospitalize by Provider.                                                    ke  

14:31 Discharge Assessment: patient administered narcotics - no. The following High Risk      ms2 

      Discharge criteria are identified: None. Admitted to Psych accompanied by tech, via         

      wheelchair, with chart. Condition: stable. No special radiology studies were completed.     

14:32 Patient left the ED.                                                                    ms2 

                                                                                                  

Signatures:                                                                                       

Dispatcher [employer]                           Russell Cohen RN RN ms2 Greene, Daniel, RN RN   dwg                                                  

Alis Cleaning, PSA                      PSA  rb                                                   

Benjamin Ledesma, PSA                    PSA  jl                                                   

Charley Zuniga, Reg                  Reg  gb                                                   

Tony Branham, Reg                    Reg  lg                                                   

Gabino Narvaez, Pardeep Noble dpm                                                  

                                                                                                  

Corrections: (The following items were deleted from the chart)                                    

13:14 12:48 Disposition: Medically cleared for disposition by [facility]     rb  

      Consult is rb                                                                               

                                                                                                  

**************************************************************************************************



*** Chart Complete ***
MTDD

## 2017-01-14 NOTE — EDDOCDS
Physician Documentation                                                                           

[facility]                                                                         

Name: John Verde                                                                                

Age: 27 yrs                                                                                       

Sex: Male                                                                                         

: 1990                                                                                   

MRN: [id number]                                                                                     

Arrival Date: 2017                                                                          

Time: 10:19                                                                                       

Account#: [id number]                                                                              

Bed [facility]                                                                                          

Private MD:                                                                                       

Disposition:                                                                                      

17 12:49 Hospitalization ordered by Arron Laboy for Inpatient Admission.           

Preliminary diagnosis is Suicidal ideations.                                                    

- Bed requested for Admit.                                                                        

- Status is Inpatient Admission.                                                              ms2 

- Condition is Stable.                                                                            

- Problem is an ongoing problem.                                                                  

- Symptoms are unchanged.                                                                         

                                                                                                  

                                                                                                  

                                                                                                  

Historical:                                                                                       

- Allergies: Chocolate; Chocolate (migraines);                                                    

- Home Meds:                                                                                      

1. none                                                                                         

2. none                                                                                         

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                                                      

- PSHx: none; umbilical hernia repair;                                                            

- Social history: Smoking status: Patient states was never smoker of tobacco. No                  

barriers to communication noted, The patient speaks fluent English, Smoking status:             

Patient states was never smoker of tobacco. No barriers to communication noted, The             

patient speaks fluent English.                                                                  

- Family history: Not pertinent.                                                                  

- : The pt / caregiver states he / she is not on anticoagulants. The pt / caregiver               

states he / she is not on anticoagulants. Home medication list is obtained from the             

patient, Home medication list is obtained from the patient.                                     

- Exposure Risk Screening:: None identified. None identified.                                     

                                                                                                  

                                                                                                  

Vital Signs:                                                                                      

                                                                                             

11:31  / 67; Pulse 82; Resp 16; Temp 97.8(O); Pulse Ox 98% on R/A; Weight 102.06 kg /   dwg 

      225 lbs; Height 6 ft. 3 in. (190.50 cm); Pain 0/10;                                         

14:07  / 88; Pulse 60; Resp 18; Temp 97.5(T); Pulse Ox 98% on R/A;                      dpm 

11:31 Body Mass Index 28.12 (102.06 kg, 190.50 cm)                                            dwg 

                                                                                                  

MDM:                                                                                              

11:24 REGULAR DIET PLASTIC WARE+DIET ordered.                                                 EDMS

11:24 Consult PFS/PSA/ ordered.                                                  dwg 

11:24 Consult PFS/PSA/: Patient's case requires discussion with on-call          dwg 

      Psychiatrist ordered.                                                                       

11:24 PSA/PFS to call Nursing Supervisor, to enter patient data [place] Safe [place] if patient    dwg 

      involuntarily admitted or transferred for SI or HI ordered.                                 

11:24 Confirm accurate psychiatric medication list and times of last dosage ordered.          dwg 

11:24 Detain Pt Until Medically/PFS Cleared ordered.                                          dwg 

11:25 Acetaminophen Level Ordered.                                                            EDMS

11:25 Basic Metabolic Profile Ordered.                                                        EDMS

11:25 Complete Blood Count Ordered.                                                           EDMS

11:25 Drug Eval Toxicology ED Only Ordered.                                                   EDMS

11:25 Ethyl Alcohol (ethanol) Ordered.                                                        EDMS

11:25 Liver Profile Ordered.                                                                  EDMS

11:25 Salicylate Level Ordered.                                                               EDMS

11:25 Thyroid Stimulating Hormone Ordered.                                                    EDMS

12:48 BED REQUEST+ADM ordered.                                                                EDMS

12:49 [place] Safe [place] reporting: The patient poses a significant risk to self or others, and      megan ASTORGA/PFS has notified the Nursing Supervisor and he/she will complete the required data      

      entry.                                                                                      

13:27 Financial registration complete.                                                        lg  

13:35 [facility] to [facility]: ordered.                                                                 EDMS

13:38 [facility] Payment Agreement was scanned into [employer] and attached to record.               lg  

13:58 [facility] Legal paperwork was scanned into [employer] and attached to record.                    dora  

                                                                                             

11:19 T-Sheet-- Draft Copy was scanned into [employer] and attached to record.                   gb  

                                                                                                  

Signatures:                                                                                       

Dispatcher [employer]                           [facility]                                                 

Russell Perea,RN                   RN   Kamran Miranda, RN                      RN   [facility]                                                  

Benjamin Ledesma PSA PSA jl Barnhardt, Gloria, Reg                  Reg  gb                                                   

Tony Branham, Reg                    Reg  lg                                                   

Gabino Narvaez, FNP                       FNP  megan                                                   

                                                                                                  

The chart was reviewed and I authenticate all verbal orders and agree with the evaluation and 
treatment provided.Attachments:

                                                                                             

13:38 [facility] Payment Agreement                                                                  

                                                                                             

11:19 T-Sheet-- Draft Copy                                                                    gb  

                                                                                                  

**************************************************************************************************



*** Chart Complete ***
MTDD

## 2017-01-14 NOTE — EDDOCDS
Physician Documentation                                                                           

[facility]                                                                         

Name: John Verde                                                                                

Age: 27 yrs                                                                                       

Sex: Male                                                                                         

: 1990                                                                                   

MRN: [id number]                                                                                     

Arrival Date: 2017                                                                          

Time: 10:19                                                                                       

Account#: [id number]                                                                              

Bed [facility]                                                                                          

Private MD:                                                                                       

Disposition:                                                                                      

17 12:49 Hospitalization ordered by Arron Laboy for Inpatient Admission.           

Preliminary diagnosis is Suicidal ideations.                                                    

- Bed requested for Admit.                                                                        

- Status is Inpatient Admission.                                                              ms2 

- Condition is Stable.                                                                            

- Problem is an ongoing problem.                                                                  

- Symptoms are unchanged.                                                                         

                                                                                                  

                                                                                                  

                                                                                                  

Historical:                                                                                       

- Allergies: Chocolate; Chocolate (migraines);                                                    

- Home Meds:                                                                                      

1. none                                                                                         

2. none                                                                                         

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                                                      

- PSHx: none; umbilical hernia repair;                                                            

- Social history: Smoking status: Patient states was never smoker of tobacco. No                  

barriers to communication noted, The patient speaks fluent English, Smoking status:             

Patient states was never smoker of tobacco. No barriers to communication noted, The             

patient speaks fluent English.                                                                  

- Family history: Not pertinent.                                                                  

- : The pt / caregiver states he / she is not on anticoagulants. The pt / caregiver               

states he / she is not on anticoagulants. Home medication list is obtained from the             

patient, Home medication list is obtained from the patient.                                     

- Exposure Risk Screening:: None identified. None identified.                                     

                                                                                                  

                                                                                                  

Vital Signs:                                                                                      

                                                                                             

11:31  / 67; Pulse 82; Resp 16; Temp 97.8(O); Pulse Ox 98% on R/A; Weight 102.06 kg /   dwg 

      225 lbs; Height 6 ft. 3 in. (190.50 cm); Pain 0/10;                                         

14:07  / 88; Pulse 60; Resp 18; Temp 97.5(T); Pulse Ox 98% on R/A;                      dpm 

11:31 Body Mass Index 28.12 (102.06 kg, 190.50 cm)                                            dwg 

                                                                                                  

MDM:                                                                                              

11:24 REGULAR DIET PLASTIC WARE+DIET ordered.                                                 EDMS

11:24 Consult PFS/PSA/ ordered.                                                  dwg 

11:24 Consult PFS/PSA/: Patient's case requires discussion with on-call          dwg 

      Psychiatrist ordered.                                                                       

11:24 PSA/PFS to call Nursing Supervisor, to [place] data [place] Safe [place] if patient    dwg 

      involuntarily admitted or transferred for SI or HI ordered.                                 

11:24 Confirm accurate psychiatric medication list and times of last dosage ordered.          dwg 

11:24 Detain Pt Until Medically/PFS Cleared ordered.                                          dwg 

11:25 Acetaminophen Level Ordered.                                                            EDMS

11:25 Basic Metabolic Profile Ordered.                                                        EDMS

11:25 Complete Blood Count Ordered.                                                           EDMS

11:25 Drug Eval Toxicology ED Only Ordered.                                                   EDMS

11:25 Ethyl Alcohol (ethanol) Ordered.                                                        EDMS

11:25 Liver Profile Ordered.                                                                  EDMS

11:25 Salicylate Level Ordered.                                                               EDMS

11:25 Thyroid Stimulating Hormone Ordered.                                                    EDMS

12:48 BED REQUEST+ADM ordered.                                                                EDMS

12:49 [place] reporting: The patient poses a significant risk to self or others, and      megan ASTORGA/PFS has notified the Nursing Supervisor and he/she will complete the required data      

      entry.                                                                                      

13:27 Financial registration complete.                                                        lg  

13:35 Admit to [facility]: ordered.                                                                 EDMS

13:38 [facility] Payment Agreement was scanned into [employer] and attached to record.               lg  

13:58 [facility] Legal paperwork was scanned into [employer] and attached to record.                    dora  

                                                                                             

11:19 T-Sheet-- Draft Copy was scanned into [employer] and attached to record.                   gb  

                                                                                                  

Signatures:                                                                                       

Dispatcher [employer]                           [facility]                                                 

Russell Perea,RN                   RN   Kamran Miranda, RN                      RN   [facility]                                                  

Benjamin Ledesma PSA PSA jl Barnhardt, Gloria, Reg                  Reg  gb                                                   

Tony Branham, Reg                    Reg  lg                                                   

Gabino Narvaez, FNP                       FNP  megan                                                   

                                                                                                  

The chart was reviewed and I authenticate all verbal orders and agree with the evaluation and 
treatment provided.Attachments:

                                                                                             

13:38 [facility] Payment Agreement                                                                  

                                                                                             

11:19 T-Sheet-- Draft Copy                                                                    gb  

                                                                                                  

**************************************************************************************************



*** Chart Complete ***
MTDD

## 2017-01-15 VITALS — DIASTOLIC BLOOD PRESSURE: 69 MMHG | SYSTOLIC BLOOD PRESSURE: 141 MMHG

## 2017-01-15 VITALS — DIASTOLIC BLOOD PRESSURE: 69 MMHG | SYSTOLIC BLOOD PRESSURE: 140 MMHG

## 2017-01-15 RX ADMIN — FAMOTIDINE SCH MG: 20 TABLET, FILM COATED ORAL at 08:15

## 2017-01-15 RX ADMIN — SERTRALINE HYDROCHLORIDE SCH MG: 50 TABLET ORAL at 08:15

## 2017-01-15 NOTE — IPN
DATE:  01/14/2017

 

The patient today states "I'm feeling better." He says his mood is 5/10 where the

closer to 10/10 is the most depressed. He is not feeling as hopeless and helpless

as he was before. He did sleep. He said he still woke up tired though and that he

will be having studies done to rule out sleep apnea.

 

MENTAL STATUS EXAMINATION: He is awake, alert, and oriented times three. Eye

contact is fair. Psychomotor activity is normal. He is verbally spontaneous. He

says his mood is better. His affect is restricted but appropriate to his mood. He

is not psychotic, suicidal or homicidal. Concentration is fair. Memory intact.

Insight and judgment is poor.

 

DIAGNOSES:

1. Adjustment disorder with mixed anxiety and depressed mood.

2. Rule out posttraumatic stress disorder (PTSD).

3. Rule out major depressive disorder.

 

TREATMENT PLAN: At this point, the patient will be further observed and evaluated

for continued elevation of his mood, stabilization of his mood and resolution of

any suicidal ideations. We will await for the clinical response from his

medications and we will titrate the medications as indicated.

## 2017-01-16 VITALS — DIASTOLIC BLOOD PRESSURE: 81 MMHG | SYSTOLIC BLOOD PRESSURE: 136 MMHG

## 2017-01-16 VITALS — SYSTOLIC BLOOD PRESSURE: 133 MMHG | DIASTOLIC BLOOD PRESSURE: 68 MMHG

## 2017-01-16 RX ADMIN — SERTRALINE HYDROCHLORIDE SCH MG: 50 TABLET ORAL at 08:07

## 2017-01-16 RX ADMIN — FAMOTIDINE SCH MG: 20 TABLET, FILM COATED ORAL at 08:07

## 2017-01-16 NOTE — IPNPDOC
[facility] Progress Note


Progress Note


DATE OF SERVICE: 1/16/17





HISTORY: This writer met with patient today to evaluate treatment progress on 

inpatient unit. Patient has been attending groups, engaging selectively with 

peers, appears to be adjusting to unit. Patient states he is feeling "better," 

reports improved thinking and mood and reduction to symptoms of anxiety and 

depression. Patient reports 4/10 anxiety, 4/10 depression, denies suicidal and 

homicidal ideation, denies audiovisual hallucinations, and denies urge to 

engage in self-injurious behavior. Patient denies all suicidal ideation since 

prior to hospital admission. Patient indicates improved sleep with use of 

trazodone, denies nightmares symptoms. Patient states appetite has stabilized 

and he reports reduced challenges with concentration and focus. Patient 

indicates improvement in energy level. Patient states Zoloft is helping to 

improve mood and reduce symptoms of anxiety, denies medication side effects. 

Patient speaks openly regarding concerns related to son's upcoming preop 

appointment, informs writer he very much wants to be present with wife to 

support child at this time. Patient is requesting discharge tomorrow so that he 

can travel to [place] to check into the [facility] tomorrow 

evening. Patient is aware there'll be a chain of command meeting tomorrow 

morning, is agreeable to being discharged to [place] where he will report for 

safety check and, if cleared, will be permitted to attend appointments in 

[place]. Patient is agreeable to completing follow-up/walk-in appointment at 

[facility] on 1/19/17.


 


VITAL SIGNS: Please see below.





NEW TEST RESULTS: EKG COMPLETED 1/13/17 SINUS BRADYCARDIA WITH SINUS ARRHYTHMIA 

ST ELEVATION, PROBABLY EARLY REPOLARIZATION


LABORATORY DATA: Please see below. On admission RBC and carbon dioxide elevated

, anion gap low. UDS negative on admission.





MENTAL STATUS EXAMINATION:


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 

cooperative, well groomed, makes improved eye contact today  


Speech: More spontaneous but remains slow, of normal volume, articulate, 

coherent


Thought processes: Clear, goal-directed


Rate of thoughts: Some increase in spontaneity  


Thought content: Rational, logical.


Abstract reasoning: Appears intact.


Associations: Intact. 


Abnormal or psychotic thoughts: Denies hallucinations, Delusions, Preoccupation 

with violence, Homicidal or suicidal ideation, and Obsessions.


Judgment: Fair, is improving


Insight: Fair, is improving


Oriented to: Time, place and person.


Recent and Remote Memory: Appears intact.


Attention Span and Concentration: Fair, is improving


Language: Normal.


Fund of knowledge: Adequate.


Mood: "Better"


Affect: Constricted, congruent with affect.





DIAGNOSES: Adjustment disorder with mixed anxiety and depressed mood, rule out 

PTSD, rule out MDD





ASSESSMENT: Patient appears to be adjusting to unit, is visible at times 

isolating to room at other times, has been attending groups and socializes with 

select peers. Patient is evaluated today after taking Zoloft 50 mg po q am X 3 

days, reports "a little" improvement to mood states he feels he can "think 

better," and notes he feels "not so depressed," denies suicidal and homicidal 

ideation, is more engageable today. Patient has no prior history of taking 

psychotropic medication. Patient denies suicidal and homicidal thinking, 

reiterates today that it is "very important" to him to be able to attend the 

appointment with his wife and child at the [facility] tomorrow 

evening and medical appointments the following day for his son. Patient is 

requesting to be discharged tomorrow, is able to devise a safety plan and 

indicates to writer he will adhere to safety plan if symptoms of anxiety and 

depression return and become unmanageable. Patient's wide, pre D/C Coordinator 

feels patient is noticeably improved and denied concerns pertaining to patient'

s discharge. Patient is able to effectively engage in the safety planning 

process and verbalizes awareness of how to access supportive services on the 

unit if needed. Will continue to monitor patient's response to Zoloft. Patient 

is aware he also has trazodone available to him for sleep and hydroxyzine 

available to him as needed for symptoms of unmanageable anxiety. Will monitor 

for medication side effects.





MANAGEMENT PLAN: Continue Zoloft 50 mg by mouth po q am, trazodone 50 mg po hs 

PRN insomnia, hydroxyzine 50 mg po q 6 hours PRN anxiety


Maintain safety precautions


Patient to attend groups and participate in unit programming to develop coping 

strategies


Engage patient in discharge planning process, chain of command meeting 

scheduled for tomorrow morning at 11 AM 


Patient to discharge to [facility] for safety check with follow

-up/walk-in appointment to be scheduled for 1/19/17


Patient to follow through with sleep eval and to follow up with PCM at [facility] post discharge





Vital Signs/I&O





 Vital Signs








  Date Time  Temp Pulse Resp B/P Pulse Ox O2 Delivery O2 Flow Rate FiO2


 


1/16/17 06:28 97.4 52 18 133/68    


 


1/12/17 14:43     96 Room Air  











Current Medications





 Current Medications


Acetaminophen (Tylenol) 650 mg Q6HP  PRN PO HEADACHE or DISCOMFORT;  Start 1/12/ 17 at 16:00;  Stop 2/11/17 at 15:59


Al Hydrox/Mg Hydrox/Simethicone (Mylanta) 30 ml Q4HP  PRN PO HEARTBURN/

INDIGESTION;  Start 1/12/17 at 16:00;  Stop 2/11/17 at 15:59


Famotidine (Pepcid) 20 mg DAILY PO  Last administered on 1/16/17at 08:07;  

Start 1/14/17 at 09:00;  Stop 2/13/17 at 08:59


Home Med (Med Rec Complete!)  ASDIRECTED XX ;  Start 1/12/17 at 13:30;  Stop 1/ 12/17 at 13:30;  Status DC


Hydroxyzine HCl (Atarax) 50 mg Q6HP  PRN PO ANXIETY;  Start 1/13/17 at 13:30;  

Stop 2/12/17 at 13:29


Magnesium Hydroxide (Milk Of Magnesia) 30 ml DAILYPRN  PRN PO CONSTIPATION;  

Start 1/12/17 at 16:00;  Stop 2/11/17 at 15:59


Sertraline HCl (Zoloft) 25 mg NOW  ONCE PO  Last administered on 1/13/17at 14:39

;  Start 1/13/17 at 13:45;  Stop 1/13/17 at 13:46;  Status DC


Sertraline HCl (Zoloft) 25 mg NOW  ONCE PO ;  Start 1/16/17 at 16:15;  Stop 1/16 /17 at 16:16;  Status UNV


Sertraline HCl (Zoloft) 50 mg QAM PO  Last administered on 1/16/17at 08:07;  

Start 1/14/17 at 09:00;  Stop 2/13/17 at 08:59


Trazodone HCl (Desyrel) 50 mg QHSP  PRN PO INSOMNIA Last administered on 1/15/

17at 21:11;  Start 1/12/17 at 16:00;  Stop 2/11/17 at 15:59





Allergies


Coded Allergies:  


     Chocolate (Unverified  Adverse Reaction, Unknown, MIGRAINES, 1/12/17)








Delia Goncalves Jan 16, 2017 17:04

## 2017-01-16 NOTE — IPN
DATE OF SERVICE:  01/15/2017

 

The patient today states, "I'm okay."  He actually says that he is somewhat

better than yesterday.  He says that he slept good.  He has no complaints.  He

said that he does feel tired and he does say that he already has testing for

sleep apnea already set up for him upon discharge.

 

MENTAL STATUS EXAMINATION:

This patient is alert and oriented times three.  Eye contact is fair.

Psychomotor activity is normal.  He is verbally spontaneous.  There is no formal

thought disorder noted.  His mood is "better."  Affect is constricted, but

appropriate to his mood.  He is not psychotic.  He denies suicidal, homicidal

ideation.  Concentration is fair.  Memory is intact.  Insight and judgment is

fair.

 

DIAGNOSIS:  Adjustment disorder with mixed anxiety and depression.

 

TREATMENT PLAN:  At this point, we will further observe this patient for further

elevation and stabilization of his mood.  We will monitor for continued

resolution of any suicidal ideation.

## 2017-01-17 VITALS — SYSTOLIC BLOOD PRESSURE: 137 MMHG | DIASTOLIC BLOOD PRESSURE: 63 MMHG

## 2017-01-17 RX ADMIN — SERTRALINE HYDROCHLORIDE SCH MG: 50 TABLET ORAL at 08:14

## 2017-01-17 RX ADMIN — FAMOTIDINE SCH MG: 20 TABLET, FILM COATED ORAL at 08:14
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
## 2017-02-14 ENCOUNTER — HOSPITAL ENCOUNTER (OUTPATIENT)
Dept: HOSPITAL 53 - M SLEEP | Age: 27
End: 2017-02-14
Attending: INTERNAL MEDICINE
Payer: OTHER GOVERNMENT

## 2017-02-14 DIAGNOSIS — G47.30: Primary | ICD-10-CM

## 2017-02-20 NOTE — SLEEPCENT
DATE OF STUDY:  02/14/2017

 

INTERPRETATION:  Nocturnal polysomnography was performed for the evaluation of

sleep apnea syndrome symptoms consisting of excessive daytime sleepiness,

snoring, observed apnea, gasping respirations, morning headaches, and

nonrestorative sleep.

 

A total of 7 hours and 46 minutes of data was reviewed with 386.5 minutes of

sleep identified.  Sleep latency was 19.5 minutes.  Rapid eye movement (REM)

latency was 411.5 minutes.  No slow wave sleep was identified.  Sleep efficiency

was 83.9%.  Electrocardiogram (EKG) showed sinus bradycardia with an average

heart rate of 54 beats per minute. Speeding and slowing was noted surrounding

some respiratory events.  No epileptiform discharge observed.  There were 31

respiratory events identified of 10 seconds in duration or longer for an

apnea-hypopnea index (AHI) of 5.1.  RERA index was 0.6, giving a total

respiratory disturbance index (RDI) of 5.7.  The events were predominantly

obstructive apneas/hypopneas.  Mean oxygen saturation for the study was 95% with

a minimum recorded value of 84%.  Arousal index was 3.4.  Periodic limb movement

index was 0.6.

 

IMPRESSION:

1.  Obstructive sleep apnea, mild.

 

RECOMMENDATIONS:  Recommend consideration of return to the sleep disorder center

for evaluation of pressure therapy.  The index is low and would first discuss

with patient prior to rescheduling the test.

## 2017-03-13 ENCOUNTER — HOSPITAL ENCOUNTER (OUTPATIENT)
Dept: HOSPITAL 53 - M SLEEP | Age: 27
End: 2017-03-13
Attending: INTERNAL MEDICINE
Payer: OTHER GOVERNMENT

## 2017-03-13 DIAGNOSIS — G47.33: Primary | ICD-10-CM

## 2017-03-17 NOTE — SLEEPCENT
DATE OF PROCEDURE:  03/13/2017

 

REFERRING PROVIDER:  Dr. Miller

 

INTERPRETATION:

Nocturnal polysomnography was performed for the evaluation of pressure therapy in

this patient with mild obstructive sleep apnea with an apnea-hypopnea index (AHI)

of 5.1 and an respiratory disturbance index (RDI) of 5.7 with associated symptoms

of excessive daytime sleepiness, snoring, observed apnea, gasping respirations,

morning headaches, and nonrestorative sleep.

 

A total of 7 hours and 21 minutes of data was reviewed for the entire titration

with 402 minutes of sleep identified. Sleep latency was 9.5 minutes. Rapid eye

movement (REM) latency was 154.5 minutes. No slow wave sleep was identified.

Sleep efficiency was 92.2%. EKG showed sinus bradycardia with an average heart

rate of 57 beats per minute. Speeding and slowing was noted surrounding some

respiratory events. No epileptiform discharge observed. The patient had been fit

with a ResMed Quattro full face mask of medium size, 4 cm of water pressure had

been applied to the circuit and the lights had been dimmed. Continuous positive

airway pressure (CPAP) initially begun at 4 cm of water pressure, was taken to a

high of 6 cm of water pressure, which appeared to be optimum. On this pressure,

his apnea-hypopnea index (AHI) was 0.5 and his respiratory arousal index (CASTRO)

was 0.2. Oxygen saturation tori was 86%. Periodic limb movement index was 4.0.

Supine REM sleep was seen on this pressure with a reasonably good waveform.

 

IMPRESSION:

1. Obstructive sleep apnea, mild, events reasonably palliated by CPAP at 6 cm of

water pressure.

 

RECOMMENDATION:

Recommend continuation of CPAP therapy at the above pressure via a medium ResMed

Quattro full face mask or mask of his preference. Clinical correlation will be

needed to ensure eradication of symptoms. It also needs to be noted that his

oxygen saturation tori was 86%, so consideration should be given of repeating an

overnight oximetry on CPAP therapy once he has been established on the device.

## 2017-04-05 ENCOUNTER — HOSPITAL ENCOUNTER (EMERGENCY)
Dept: HOSPITAL 53 - M ED | Age: 27
Discharge: HOME | End: 2017-04-05
Payer: OTHER GOVERNMENT

## 2017-04-05 VITALS — BODY MASS INDEX: 29.52 KG/M2 | WEIGHT: 230 LBS | HEIGHT: 74 IN

## 2017-04-05 VITALS — SYSTOLIC BLOOD PRESSURE: 136 MMHG | DIASTOLIC BLOOD PRESSURE: 87 MMHG

## 2017-04-05 DIAGNOSIS — F43.20: Primary | ICD-10-CM

## 2017-04-05 LAB
ALBUMIN SERPL BCG-MCNC: 4.3 GM/DL (ref 3.2–5.2)
ALBUMIN/GLOB SERPL: 1.3 {RATIO} (ref 1–1.93)
ALP SERPL-CCNC: 129 U/L (ref 45–117)
ALT SERPL W P-5'-P-CCNC: 81 U/L (ref 12–78)
ANION GAP SERPL CALC-SCNC: 6 MEQ/L (ref 8–16)
AST SERPL-CCNC: 27 U/L (ref 15–37)
BILIRUB CONJ SERPL-MCNC: 0.1 MG/DL (ref 0–0.2)
BILIRUB SERPL-MCNC: 0.4 MG/DL (ref 0.2–1)
BUN SERPL-MCNC: 11 MG/DL (ref 7–18)
CALCIUM SERPL-MCNC: 9.2 MG/DL (ref 8.5–10.1)
CHLORIDE SERPL-SCNC: 103 MEQ/L (ref 98–107)
CO2 SERPL-SCNC: 31 MEQ/L (ref 21–32)
CREAT SERPL-MCNC: 1.1 MG/DL (ref 0.7–1.3)
ERYTHROCYTE [DISTWIDTH] IN BLOOD BY AUTOMATED COUNT: 12.2 % (ref 11.5–14.5)
GFR SERPL CREATININE-BSD FRML MDRD: > 60 ML/MIN/{1.73_M2} (ref 60–?)
GLUCOSE SERPL-MCNC: 93 MG/DL (ref 70–105)
MCH RBC QN AUTO: 27.5 PG (ref 27–33)
MCHC RBC AUTO-ENTMCNC: 33.3 G/DL (ref 32–36.5)
MCV RBC AUTO: 82.4 FL (ref 80–96)
METHADONE UR QL SCN: NEGATIVE
PLATELET # BLD AUTO: 187 K/MM3 (ref 150–450)
POTASSIUM SERPL-SCNC: 4 MEQ/L (ref 3.5–5.1)
PROT SERPL-MCNC: 7.6 GM/DL (ref 6.4–8.2)
SODIUM SERPL-SCNC: 140 MEQ/L (ref 136–145)
WBC # BLD AUTO: 4.6 K/MM3 (ref 4–10)

## 2017-04-05 PROCEDURE — 80048 BASIC METABOLIC PNL TOTAL CA: CPT

## 2017-04-05 PROCEDURE — 80076 HEPATIC FUNCTION PANEL: CPT

## 2017-04-05 PROCEDURE — 99285 EMERGENCY DEPT VISIT HI MDM: CPT

## 2017-04-05 PROCEDURE — 80306 DRUG TEST PRSMV INSTRMNT: CPT

## 2017-04-05 PROCEDURE — 85027 COMPLETE CBC AUTOMATED: CPT

## 2017-04-05 PROCEDURE — 84443 ASSAY THYROID STIM HORMONE: CPT

## 2021-10-11 ENCOUNTER — HOSPITAL ENCOUNTER (EMERGENCY)
Dept: HOSPITAL 53 - M ED | Age: 31
Discharge: HOME | End: 2021-10-11
Payer: OTHER GOVERNMENT

## 2021-10-11 VITALS — SYSTOLIC BLOOD PRESSURE: 132 MMHG | DIASTOLIC BLOOD PRESSURE: 78 MMHG

## 2021-10-11 VITALS — HEIGHT: 74 IN | BODY MASS INDEX: 31.15 KG/M2 | WEIGHT: 242.73 LBS

## 2021-10-11 DIAGNOSIS — F32.9: ICD-10-CM

## 2021-10-11 DIAGNOSIS — F41.9: ICD-10-CM

## 2021-10-11 DIAGNOSIS — J12.9: Primary | ICD-10-CM

## 2021-10-11 DIAGNOSIS — J45.909: ICD-10-CM

## 2021-10-11 DIAGNOSIS — Z79.899: ICD-10-CM

## 2021-10-11 DIAGNOSIS — R51.9: ICD-10-CM

## 2021-10-11 LAB
ALBUMIN SERPL BCG-MCNC: 3.1 GM/DL (ref 3.2–5.2)
ALT SERPL W P-5'-P-CCNC: 58 U/L (ref 12–78)
BASOPHILS # BLD AUTO: 0 10^3/UL (ref 0–0.2)
BASOPHILS NFR BLD AUTO: 0.3 % (ref 0–1)
BILIRUB SERPL-MCNC: 0.7 MG/DL (ref 0.2–1)
BUN SERPL-MCNC: 8 MG/DL (ref 7–18)
CALCIUM SERPL-MCNC: 8.3 MG/DL (ref 8.5–10.1)
CHLORIDE SERPL-SCNC: 104 MEQ/L (ref 98–107)
CO2 SERPL-SCNC: 31 MEQ/L (ref 21–32)
CREAT SERPL-MCNC: 1 MG/DL (ref 0.7–1.3)
EOSINOPHIL # BLD AUTO: 0 10^3/UL (ref 0–0.5)
EOSINOPHIL NFR BLD AUTO: 1 % (ref 0–3)
GFR SERPL CREATININE-BSD FRML MDRD: > 60 ML/MIN/{1.73_M2} (ref 60–?)
GLUCOSE SERPL-MCNC: 99 MG/DL (ref 70–100)
HCT VFR BLD AUTO: 44.9 % (ref 42–52)
HGB BLD-MCNC: 15.1 G/DL (ref 13.5–17.5)
LYMPHOCYTES # BLD AUTO: 0.6 10^3/UL (ref 1.5–5)
LYMPHOCYTES NFR BLD AUTO: 21.5 % (ref 24–44)
MCH RBC QN AUTO: 27.1 PG (ref 27–33)
MCHC RBC AUTO-ENTMCNC: 33.6 G/DL (ref 32–36.5)
MCV RBC AUTO: 80.6 FL (ref 80–96)
MONOCYTES # BLD AUTO: 0.4 10^3/UL (ref 0–0.8)
MONOCYTES NFR BLD AUTO: 12.8 % (ref 2–8)
NEUTROPHILS # BLD AUTO: 1.8 10^3/UL (ref 1.5–8.5)
NEUTROPHILS NFR BLD AUTO: 64.1 % (ref 36–66)
PLATELET # BLD AUTO: 151 10^3/UL (ref 150–450)
POTASSIUM SERPL-SCNC: 3.7 MEQ/L (ref 3.5–5.1)
PROT SERPL-MCNC: 6.5 GM/DL (ref 6.4–8.2)
RBC # BLD AUTO: 5.57 10^6/UL (ref 4.3–6.1)
RSV RNA NPH QL NAA+PROBE: NEGATIVE
SODIUM SERPL-SCNC: 141 MEQ/L (ref 136–145)
WBC # BLD AUTO: 2.9 10^3/UL (ref 4–10)

## 2021-10-11 NOTE — REP
INDICATION:

Coronavirus workup



COMPARISON:

None.



TECHNIQUE:

Portable AP view of the chest



FINDINGS:

The mediastinum and cardiac silhouette are within normal limits for portable

technique.  The lung fields demonstrate subtle scattered opacities.  No effusion or

pneumothorax.  Skeletal structures are intact.



IMPRESSION:

Very subtle scattered opacities cannot be excluded and consistent with the given

history of COVID-19 pulmonary disease.





<Electronically signed by Floyd Martin > 10/11/21 [id number]